# Patient Record
Sex: MALE | Employment: UNEMPLOYED | URBAN - METROPOLITAN AREA
[De-identification: names, ages, dates, MRNs, and addresses within clinical notes are randomized per-mention and may not be internally consistent; named-entity substitution may affect disease eponyms.]

---

## 2018-05-17 ENCOUNTER — HOSPITAL ENCOUNTER (EMERGENCY)
Age: 23
Discharge: HOME | End: 2018-05-17
Attending: EMERGENCY MEDICINE
Payer: OTHER GOVERNMENT

## 2018-05-17 ENCOUNTER — APPOINTMENT (OUTPATIENT)
Dept: CT IMAGING | Age: 23
End: 2018-05-17
Attending: EMERGENCY MEDICINE
Payer: OTHER GOVERNMENT

## 2018-05-17 ENCOUNTER — APPOINTMENT (OUTPATIENT)
Dept: GENERAL RADIOLOGY | Age: 23
End: 2018-05-17
Attending: EMERGENCY MEDICINE
Payer: OTHER GOVERNMENT

## 2018-05-17 VITALS
RESPIRATION RATE: 18 BRPM | WEIGHT: 211 LBS | SYSTOLIC BLOOD PRESSURE: 150 MMHG | TEMPERATURE: 97.5 F | BODY MASS INDEX: 28.58 KG/M2 | HEIGHT: 72 IN | HEART RATE: 81 BPM | DIASTOLIC BLOOD PRESSURE: 95 MMHG | OXYGEN SATURATION: 100 %

## 2018-05-17 DIAGNOSIS — N30.00 ACUTE CYSTITIS WITHOUT HEMATURIA: Primary | ICD-10-CM

## 2018-05-17 LAB
ABO + RH BLD: NORMAL
ALBUMIN SERPL-MCNC: 4.3 G/DL (ref 3.4–5)
ALBUMIN/GLOB SERPL: 1 {RATIO} (ref 0.8–1.7)
ALP SERPL-CCNC: 54 U/L (ref 45–117)
ALT SERPL-CCNC: 51 U/L (ref 16–61)
ANION GAP SERPL CALC-SCNC: 7 MMOL/L (ref 3–18)
APPEARANCE UR: CLEAR
AST SERPL-CCNC: 24 U/L (ref 15–37)
BACTERIA URNS QL MICRO: NEGATIVE /HPF
BASOPHILS # BLD: 0 K/UL (ref 0–0.06)
BASOPHILS NFR BLD: 0 % (ref 0–2)
BILIRUB SERPL-MCNC: 0.6 MG/DL (ref 0.2–1)
BILIRUB UR QL: NEGATIVE
BLOOD GROUP ANTIBODIES SERPL: NORMAL
BUN SERPL-MCNC: 10 MG/DL (ref 7–18)
BUN/CREAT SERPL: 6 (ref 12–20)
CALCIUM SERPL-MCNC: 9.4 MG/DL (ref 8.5–10.1)
CHLORIDE SERPL-SCNC: 101 MMOL/L (ref 100–108)
CO2 SERPL-SCNC: 31 MMOL/L (ref 21–32)
COLOR UR: YELLOW
CREAT SERPL-MCNC: 1.54 MG/DL (ref 0.6–1.3)
DIFFERENTIAL METHOD BLD: NORMAL
EOSINOPHIL # BLD: 0.1 K/UL (ref 0–0.4)
EOSINOPHIL NFR BLD: 1 % (ref 0–5)
EPITH CASTS URNS QL MICRO: NORMAL /LPF (ref 0–5)
ERYTHROCYTE [DISTWIDTH] IN BLOOD BY AUTOMATED COUNT: 12.2 % (ref 11.6–14.5)
GLOBULIN SER CALC-MCNC: 4.3 G/DL (ref 2–4)
GLUCOSE SERPL-MCNC: 99 MG/DL (ref 74–99)
GLUCOSE UR STRIP.AUTO-MCNC: NEGATIVE MG/DL
HCT VFR BLD AUTO: 44.9 % (ref 36–48)
HGB BLD-MCNC: 15.6 G/DL (ref 13–16)
HGB UR QL STRIP: NEGATIVE
KETONES UR QL STRIP.AUTO: NEGATIVE MG/DL
LACTATE BLD-SCNC: 0.7 MMOL/L (ref 0.4–2)
LEUKOCYTE ESTERASE UR QL STRIP.AUTO: ABNORMAL
LYMPHOCYTES # BLD: 2.4 K/UL (ref 0.9–3.6)
LYMPHOCYTES NFR BLD: 30 % (ref 21–52)
MCH RBC QN AUTO: 30.3 PG (ref 24–34)
MCHC RBC AUTO-ENTMCNC: 34.7 G/DL (ref 31–37)
MCV RBC AUTO: 87.2 FL (ref 74–97)
MONOCYTES # BLD: 0.7 K/UL (ref 0.05–1.2)
MONOCYTES NFR BLD: 9 % (ref 3–10)
NEUTS SEG # BLD: 4.8 K/UL (ref 1.8–8)
NEUTS SEG NFR BLD: 60 % (ref 40–73)
NITRITE UR QL STRIP.AUTO: NEGATIVE
PH UR STRIP: 6.5 [PH] (ref 5–8)
PLATELET # BLD AUTO: 228 K/UL (ref 135–420)
PMV BLD AUTO: 10.8 FL (ref 9.2–11.8)
POTASSIUM SERPL-SCNC: 3.8 MMOL/L (ref 3.5–5.5)
PROT SERPL-MCNC: 8.6 G/DL (ref 6.4–8.2)
PROT UR STRIP-MCNC: NEGATIVE MG/DL
RBC # BLD AUTO: 5.15 M/UL (ref 4.7–5.5)
RBC #/AREA URNS HPF: NEGATIVE /HPF (ref 0–5)
SODIUM SERPL-SCNC: 139 MMOL/L (ref 136–145)
SP GR UR REFRACTOMETRY: >1.03 (ref 1–1.03)
SPECIMEN EXP DATE BLD: NORMAL
UROBILINOGEN UR QL STRIP.AUTO: 0.2 EU/DL (ref 0.2–1)
WBC # BLD AUTO: 8 K/UL (ref 4.6–13.2)
WBC URNS QL MICRO: NORMAL /HPF (ref 0–4)

## 2018-05-17 PROCEDURE — 96374 THER/PROPH/DIAG INJ IV PUSH: CPT

## 2018-05-17 PROCEDURE — 71045 X-RAY EXAM CHEST 1 VIEW: CPT

## 2018-05-17 PROCEDURE — 80053 COMPREHEN METABOLIC PANEL: CPT | Performed by: EMERGENCY MEDICINE

## 2018-05-17 PROCEDURE — 87040 BLOOD CULTURE FOR BACTERIA: CPT | Performed by: EMERGENCY MEDICINE

## 2018-05-17 PROCEDURE — 74011000250 HC RX REV CODE- 250: Performed by: EMERGENCY MEDICINE

## 2018-05-17 PROCEDURE — 85025 COMPLETE CBC W/AUTO DIFF WBC: CPT | Performed by: EMERGENCY MEDICINE

## 2018-05-17 PROCEDURE — 86900 BLOOD TYPING SEROLOGIC ABO: CPT | Performed by: EMERGENCY MEDICINE

## 2018-05-17 PROCEDURE — 74177 CT ABD & PELVIS W/CONTRAST: CPT

## 2018-05-17 PROCEDURE — 83605 ASSAY OF LACTIC ACID: CPT

## 2018-05-17 PROCEDURE — 74011636320 HC RX REV CODE- 636/320: Performed by: EMERGENCY MEDICINE

## 2018-05-17 PROCEDURE — 99284 EMERGENCY DEPT VISIT MOD MDM: CPT

## 2018-05-17 PROCEDURE — 96375 TX/PRO/DX INJ NEW DRUG ADDON: CPT

## 2018-05-17 PROCEDURE — 74011250637 HC RX REV CODE- 250/637: Performed by: EMERGENCY MEDICINE

## 2018-05-17 PROCEDURE — 74011250636 HC RX REV CODE- 250/636: Performed by: EMERGENCY MEDICINE

## 2018-05-17 PROCEDURE — 81001 URINALYSIS AUTO W/SCOPE: CPT | Performed by: EMERGENCY MEDICINE

## 2018-05-17 RX ORDER — ONDANSETRON 2 MG/ML
4 INJECTION INTRAMUSCULAR; INTRAVENOUS
Status: COMPLETED | OUTPATIENT
Start: 2018-05-17 | End: 2018-05-17

## 2018-05-17 RX ORDER — METRONIDAZOLE 500 MG/100ML
500 INJECTION, SOLUTION INTRAVENOUS EVERY 12 HOURS
Status: DISCONTINUED | OUTPATIENT
Start: 2018-05-17 | End: 2018-05-18 | Stop reason: HOSPADM

## 2018-05-17 RX ORDER — CEPHALEXIN 500 MG/1
500 CAPSULE ORAL 4 TIMES DAILY
Qty: 20 CAP | Refills: 0 | Status: SHIPPED | OUTPATIENT
Start: 2018-05-17 | End: 2018-05-22

## 2018-05-17 RX ORDER — MORPHINE SULFATE 4 MG/ML
4 INJECTION INTRAVENOUS
Status: DISCONTINUED | OUTPATIENT
Start: 2018-05-17 | End: 2018-05-18 | Stop reason: HOSPADM

## 2018-05-17 RX ORDER — SODIUM CHLORIDE 9 MG/ML
150 INJECTION, SOLUTION INTRAVENOUS ONCE
Status: DISCONTINUED | OUTPATIENT
Start: 2018-05-17 | End: 2018-05-18 | Stop reason: HOSPADM

## 2018-05-17 RX ORDER — SODIUM CHLORIDE 0.9 % (FLUSH) 0.9 %
5-10 SYRINGE (ML) INJECTION AS NEEDED
Status: DISCONTINUED | OUTPATIENT
Start: 2018-05-17 | End: 2018-05-18 | Stop reason: HOSPADM

## 2018-05-17 RX ORDER — ACETAMINOPHEN 500 MG
1000 TABLET ORAL ONCE
Status: COMPLETED | OUTPATIENT
Start: 2018-05-17 | End: 2018-05-17

## 2018-05-17 RX ORDER — ACETAMINOPHEN 325 MG/1
650 TABLET ORAL
Qty: 30 TAB | Refills: 0 | Status: SHIPPED | OUTPATIENT
Start: 2018-05-17 | End: 2019-04-01 | Stop reason: ALTCHOICE

## 2018-05-17 RX ADMIN — CEFEPIME HYDROCHLORIDE 2 G: 2 INJECTION, POWDER, FOR SOLUTION INTRAVENOUS at 17:41

## 2018-05-17 RX ADMIN — ACETAMINOPHEN 1000 MG: 500 TABLET, FILM COATED ORAL at 19:42

## 2018-05-17 RX ADMIN — ONDANSETRON 4 MG: 2 INJECTION INTRAMUSCULAR; INTRAVENOUS at 17:27

## 2018-05-17 RX ADMIN — IOPAMIDOL 80 ML: 612 INJECTION, SOLUTION INTRAVENOUS at 18:19

## 2018-05-17 NOTE — Clinical Note
Your evaluation today showed generalized abdominal pain. Please follow up with a doctor as discussed. The evaluation and treatment done today requires that you follow up with a physician for re-evaluation. Medical problems can change over time and symp toms can get worse or new symptoms can develop over time, therefore, it is important that you follow up as we discussed. Please immediately return to the ER if you have any concerns. Call the ER if you have any questions about what we discussed. At [facility] Emergency Department we are genuinely concerned about your health and comfort. You may be selected to participate in a patient satisfaction survey mailed to your home. We are excited about the opportunity to learn from your expe rience so we may continue to improve. In striving for the very best we believe good is not good enough, but if you rate us as EXCELLENT in all boxes, we have succeeded. We strive to provide EXCELLENT care to you and your family. We appreciate the opportu nity to take care of you, and hope you do well.

## 2018-05-17 NOTE — DISCHARGE INSTRUCTIONS
Male Urinary Tract: Anatomy Sketch    Current as of: March 14, 2017  Content Version: 11.4  © 2575-5393 [employer]. Care instructions adapted under license by [employer] (which disclaims liability or warranty for this information). If you have questions about a medical condition or this instruction, always ask your healthcare professional. [facility] [address] any warranty or liability for your use of this information. Urinary Tract Infection in Male Teens: Care Instructions  Your Care Instructions    A urinary tract infection, or UTI, is a term for an infection anywhere between the kidneys and the urethra. (The urethra is the tube that carries urine from the bladder to outside the body.) Most UTIs are bladder infections. They often cause pain or burning when you urinate. UTIs are caused by bacteria. This means they can be cured with antibiotics. Be sure to complete your treatment so that the infection does not get worse. Your doctor may have you get tests to find out the cause of your UTI. Follow-up care is a key part of your treatment and safety. Be sure to make and go to all appointments, and call your doctor if you are having problems. It's also a good idea to know your test results and keep a list of the medicines you take. How can you care for yourself at home? · Take your antibiotics as directed. Do not stop taking them just because you feel better. You need to take the full course of antibiotics. · Drink extra water and other fluids for the next day or two. This will help make the urine less concentrated and help wash out the bacteria that are causing the infection. (If you have kidney, heart, or liver disease and have to limit fluids, talk with your doctor before you increase the amount of fluids you drink.)  · Avoid carbonated drinks and drinks that have caffeine. They can irritate the bladder. · Urinate often.  Try to empty your bladder each time.  · To relieve pain, take a hot bath. Or you can lay a heating pad set on low over your lower belly or genital area. Never go to sleep with a heating pad in place. To prevent UTIs  · Drink plenty of water each day. This helps you urinate often, which clears bacteria from your system. (If you have kidney, heart, or liver disease and have to limit fluids, talk with your doctor before you increase the amount of fluids you drink.)  · Urinate when you need to. · Keep the tip of your penis clean, especially if you are uncircumcised. The foreskin can trap bacteria, which can then get into the urinary tract and cause an infection. When should you call for help? Call your doctor now or seek immediate medical care if:  ? · Symptoms such as fever, chills, nausea, or vomiting get worse or appear for the first time. ? · You have new pain in your back just below your rib cage. This is called flank pain. ? · There is new blood or pus in your urine. ? · You have any problems with your antibiotic medicine. ? Watch closely for changes in your health, and be sure to contact your doctor if:  ? · You are not getting better after taking an antibiotic for 2 days. ? · Your symptoms go away but then come back. Where can you learn more? Go to http://dorina-lakia.info/. Enter H336 in the search box to learn more about \"Urinary Tract Infection in Male Teens: Care Instructions. \"  Current as of: May 12, 2017  Content Version: 11.4  © 1814-3853 [employer]. Care instructions adapted under license by [employer] (which disclaims liability or warranty for this information). If you have questions about a medical condition or this instruction, always ask your healthcare professional. [address] any warranty or liability for your use of this information.
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
## 2018-05-23 LAB
BACTERIA SPEC CULT: NORMAL
BACTERIA SPEC CULT: NORMAL
SERVICE CMNT-IMP: NORMAL
SERVICE CMNT-IMP: NORMAL

## 2018-07-13 ENCOUNTER — HOSPITAL ENCOUNTER (OUTPATIENT)
Dept: CT IMAGING | Age: 23
Discharge: HOME | End: 2018-07-13
Attending: FAMILY MEDICINE
Payer: OTHER GOVERNMENT

## 2018-07-13 DIAGNOSIS — R51.9 HEAD ACHE: ICD-10-CM

## 2018-07-13 PROCEDURE — 70450 CT HEAD/BRAIN W/O DYE: CPT

## 2018-10-19 ENCOUNTER — HOSPITAL ENCOUNTER (EMERGENCY)
Age: 23
Discharge: HOME | End: 2018-10-19
Attending: EMERGENCY MEDICINE
Payer: OTHER GOVERNMENT

## 2018-10-19 VITALS
WEIGHT: 213 LBS | BODY MASS INDEX: 28.89 KG/M2 | TEMPERATURE: 97.9 F | SYSTOLIC BLOOD PRESSURE: 124 MMHG | RESPIRATION RATE: 16 BRPM | DIASTOLIC BLOOD PRESSURE: 76 MMHG | HEART RATE: 67 BPM | OXYGEN SATURATION: 98 %

## 2018-10-19 DIAGNOSIS — R30.0 DYSURIA: Primary | ICD-10-CM

## 2018-10-19 LAB
APPEARANCE UR: CLEAR
BACTERIA URNS QL MICRO: ABNORMAL /HPF
BILIRUB UR QL: NEGATIVE
COLOR UR: YELLOW
EPITH CASTS URNS QL MICRO: ABNORMAL /LPF (ref 0–5)
GLUCOSE UR STRIP.AUTO-MCNC: NEGATIVE MG/DL
HGB UR QL STRIP: NEGATIVE
KETONES UR QL STRIP.AUTO: NEGATIVE MG/DL
LEUKOCYTE ESTERASE UR QL STRIP.AUTO: ABNORMAL
NITRITE UR QL STRIP.AUTO: NEGATIVE
PH UR STRIP: 7 [PH] (ref 5–8)
PROT UR STRIP-MCNC: NEGATIVE MG/DL
RBC #/AREA URNS HPF: ABNORMAL /HPF (ref 0–5)
SP GR UR REFRACTOMETRY: <1.005 (ref 1–1.03)
UROBILINOGEN UR QL STRIP.AUTO: 0.2 EU/DL (ref 0.2–1)
WBC URNS QL MICRO: ABNORMAL /HPF (ref 0–4)

## 2018-10-19 PROCEDURE — 87491 CHLMYD TRACH DNA AMP PROBE: CPT | Performed by: PHYSICIAN ASSISTANT

## 2018-10-19 PROCEDURE — 87086 URINE CULTURE/COLONY COUNT: CPT | Performed by: PHYSICIAN ASSISTANT

## 2018-10-19 PROCEDURE — 99283 EMERGENCY DEPT VISIT LOW MDM: CPT

## 2018-10-19 PROCEDURE — 81001 URINALYSIS AUTO W/SCOPE: CPT | Performed by: EMERGENCY MEDICINE

## 2018-10-19 NOTE — ED NOTES
Discharge instructions discussed with patient. Patient verbalize understanding. Patient left without discharge paper work.

## 2018-10-19 NOTE — DISCHARGE INSTRUCTIONS
Painful Urination (Dysuria): Care Instructions  Your Care Instructions  Burning pain with urination (dysuria) is a common symptom of a urinary tract infection or other urinary problems. The bladder may become inflamed. This can cause pain when the bladder fills and empties. You may also feel pain if the tube that carries urine from the bladder to the outside of the body (urethra) gets irritated or infected. Sexually transmitted infections (STIs) also may cause pain when you urinate. Sometimes the pain can be caused by things other than an infection. The urethra can be irritated by soaps, perfumes, or foreign objects in the urethra. Kidney stones can cause pain when they pass through the urethra. The cause may be hard to find. You may need tests. Treatment for painful urination depends on the cause. Follow-up care is a key part of your treatment and safety. Be sure to make and go to all appointments, and call your doctor if you are having problems. It's also a good idea to know your test results and keep a list of the medicines you take. How can you care for yourself at home? · Drink extra water for the next day or two. This will help make the urine less concentrated. (If you have kidney, heart, or liver disease and have to limit fluids, talk with your doctor before you increase the amount of fluids you drink.)  · Avoid drinks that are carbonated or have caffeine. They can irritate the bladder. · Urinate often. Try to empty your bladder each time. For women:  · Urinate right after you have sex. · After going to the bathroom, wipe from front to back. · Avoid douches, bubble baths, and feminine hygiene sprays. And avoid other feminine hygiene products that have deodorants. When should you call for help? Call your doctor now or seek immediate medical care if:    · You have new symptoms, such as fever, nausea, or vomiting.     · You have new or worse symptoms of a urinary problem. For example:  ?  You have blood or pus in your urine. ? You have chills or body aches. ? It hurts worse to urinate. ? You have groin or belly pain. ? You have pain in your back just below your rib cage (the flank area).    Watch closely for changes in your health, and be sure to contact your doctor if you have any problems. Where can you learn more? Go to http://dorina-lakia.info/. Enter B093 in the search box to learn more about \"Painful Urination (Dysuria): Care Instructions. \"  Current as of: March 21, 2018  Content Version: 11.8  © 0894-9449 [employer]. Care instructions adapted under license by [employer] (which disclaims liability or warranty for this information). If you have questions about a medical condition or this instruction, always ask your healthcare professional. [address] any warranty or liability for your use of this information.

## 2018-10-19 NOTE — ED PROVIDER NOTES
EMERGENCY DEPARTMENT HISTORY AND PHYSICAL EXAM 
 
Date: 10/19/2018 Patient Name: Lorie Lynne History of Presenting Illness Chief Complaint Patient presents with  Urinary Pain History Provided By: Patient Chief Complaint: dysuria Duration: days Timing:  Acute Location:  Quality: Burning Severity: Moderate Modifying Factors: pain with urination Associated Symptoms: none Additional History (Context): Lorie Lynne is a 20 y.o. male with noted medical hx who presents with dysuria for the past couple days after he started taking pre workout. Treated sx with cranberry azo. Sx resolved on its own and he has not had any dysuria today. No concern for STD/STI. Otherwise fine. Denies fever, chills, abdominal pain, N/V, hematuria, penile d/c, swelling, color change, or any other associated sx. No other complaints or concerns. PCP: Kris Ashton, DO 
 
Current Outpatient Medications Medication Sig Dispense Refill  acetaminophen (TYLENOL) 325 mg tablet Take 2 Tabs by mouth every four (4) hours as needed for Pain. 30 Tab 0 Past History Past Medical History: No past medical history on file. Past Surgical History: No past surgical history on file. Family History: No family history on file. Social History: 
Social History Tobacco Use  Smoking status: Current Every Day Smoker  Smokeless tobacco: Current User Substance Use Topics  Alcohol use: Not on file  Drug use: Yes Types: Marijuana Allergies: 
No Known Allergies Review of Systems Review of Systems Constitutional: Negative for chills and fever. Respiratory: Negative for shortness of breath. Cardiovascular: Negative for chest pain. Gastrointestinal: Negative for abdominal pain, nausea and vomiting. Genitourinary: Positive for dysuria. Negative for discharge, genital sores, hematuria and penile pain. Musculoskeletal: Negative for back pain and neck pain. All other systems reviewed and are negative. All Other Systems Negative Physical Exam  
 
Vitals:  
 10/19/18 0951 10/19/18 1141 BP:  124/76 Pulse: 67 Resp: 16 Temp: 97.9 °F (36.6 °C) SpO2: 98% Weight: 96.6 kg (213 lb) Physical Exam  
Constitutional: He is oriented to person, place, and time. He appears well-developed and well-nourished. No distress. HENT:  
Head: Normocephalic and atraumatic. Eyes: Conjunctivae are normal.  
Neck: Normal range of motion. Neck supple. Cardiovascular: Normal rate, regular rhythm and normal heart sounds. Pulmonary/Chest: Effort normal and breath sounds normal. No respiratory distress. He exhibits no tenderness. Abdominal: Soft. Bowel sounds are normal. He exhibits no distension. There is no tenderness. There is no rebound and no guarding. Musculoskeletal: Normal range of motion. He exhibits no edema or deformity. Neurological: He is alert and oriented to person, place, and time. Skin: Skin is warm and dry. He is not diaphoretic. Psychiatric: He has a normal mood and affect. Nursing note and vitals reviewed. Diagnostic Study Results Labs - Recent Results (from the past 12 hour(s)) URINALYSIS W/ RFLX MICROSCOPIC Collection Time: 10/19/18  9:34 AM  
Result Value Ref Range Color YELLOW Appearance CLEAR Specific gravity <1.005 (L) 1.005 - 1.030  
 pH (UA) 7.0 5.0 - 8.0 Protein NEGATIVE  NEG mg/dL Glucose NEGATIVE  NEG mg/dL Ketone NEGATIVE  NEG mg/dL Bilirubin NEGATIVE  NEG Blood NEGATIVE  NEG Urobilinogen 0.2 0.2 - 1.0 EU/dL Nitrites NEGATIVE  NEG Leukocyte Esterase TRACE (A) NEG URINE MICROSCOPIC ONLY Collection Time: 10/19/18  9:34 AM  
Result Value Ref Range WBC 2 to 6 0 - 4 /hpf  
 RBC NONE 0 - 5 /hpf Epithelial cells FEW 0 - 5 /lpf Bacteria FEW (A) NEG /hpf Radiologic Studies -  
 No orders to display CT Results  (Last 48 hours) None CXR Results  (Last 48 hours) None Medical Decision Making I am the first provider for this patient. I reviewed the vital signs, available nursing notes, past medical history, past surgical history, family history and social history. Vital Signs-Reviewed the patient's vital signs. Pulse Oximetry Analysis -  100 % RA Records Reviewed: Nursing Notes and Old Medical Records Procedures: None Procedures Provider Notes (Medical Decision Making):  
 
 
Differential: kidney stone, UTI, GC, HSV Plan: Will order ua 11:45 AM 
Have shared reassuring UA results with patient, advised to discontinue prework out and to focus on hydration. Advised follow up with PCP if symptoms return. Patient agrees with the plan and management and states all questions have been thoroughly answered and there are no more remaining questions. MED RECONCILIATION: 
No current facility-administered medications for this encounter. Current Outpatient Medications Medication Sig  
 acetaminophen (TYLENOL) 325 mg tablet Take 2 Tabs by mouth every four (4) hours as needed for Pain. Disposition: 
Home DISCHARGE NOTE:  
Pt has been reexamined. Patient has no new complaints, changes, or physical findings. Care plan outlined and precautions discussed. Results of workup were reviewed with the patient. All medications were reviewed with the patient. All of pt's questions and concerns were addressed. Patient was instructed and agrees to follow up with PCP, as well as to return to the ED upon further deterioration. Patient is ready to go home. Follow-up Information Follow up With Specialties Details Why Contact Info [facility] EMERGENCY DEPT Emergency Medicine  As needed [address] [id number] 
[phone] Alejandra Roque, DO Family Practice In 2 days As needed [address] [place] [address] [place] [address] 
[phone] Current Discharge Medication List  
  
 
 
 
 
Diagnosis Clinical Impression: 1. Dysuria Scribe Attestation Kim Solis acting as a scribe for and in the presence of [place], [place] October 19, 2018 at 11:34 AM 
    
Provider Attestation:     
I personally performed the services described in the documentation, reviewed the documentation, as recorded by the scribe in my presence, and it accurately and completely records my words and actions.  October 19, 2018 at 11:34 AM - [place], [place]

## 2018-10-20 LAB
BACTERIA SPEC CULT: NORMAL
SERVICE CMNT-IMP: NORMAL

## 2018-10-22 ENCOUNTER — HOSPITAL ENCOUNTER (EMERGENCY)
Age: 23
Discharge: HOME | End: 2018-10-22
Attending: EMERGENCY MEDICINE
Payer: OTHER GOVERNMENT

## 2018-10-22 VITALS
RESPIRATION RATE: 18 BRPM | SYSTOLIC BLOOD PRESSURE: 139 MMHG | DIASTOLIC BLOOD PRESSURE: 85 MMHG | HEART RATE: 80 BPM | OXYGEN SATURATION: 100 % | TEMPERATURE: 97.6 F

## 2018-10-22 DIAGNOSIS — R30.0 DYSURIA: Primary | ICD-10-CM

## 2018-10-22 LAB
APPEARANCE UR: CLEAR
BACTERIA URNS QL MICRO: ABNORMAL /HPF
BILIRUB UR QL: NEGATIVE
C TRACH RRNA SPEC QL NAA+PROBE: POSITIVE
C TRACH RRNA SPEC QL NAA+PROBE: POSITIVE
COLOR UR: YELLOW
EPITH CASTS URNS QL MICRO: ABNORMAL /LPF (ref 0–5)
GLUCOSE UR STRIP.AUTO-MCNC: NEGATIVE MG/DL
HGB UR QL STRIP: NEGATIVE
KETONES UR QL STRIP.AUTO: ABNORMAL MG/DL
LEUKOCYTE ESTERASE UR QL STRIP.AUTO: ABNORMAL
N GONORRHOEA RRNA SPEC QL NAA+PROBE: NEGATIVE
N GONORRHOEA RRNA SPEC QL NAA+PROBE: NEGATIVE
NITRITE UR QL STRIP.AUTO: NEGATIVE
PH UR STRIP: 6 [PH] (ref 5–8)
PROT UR STRIP-MCNC: NEGATIVE MG/DL
RBC #/AREA URNS HPF: ABNORMAL /HPF (ref 0–5)
SP GR UR REFRACTOMETRY: 1.03 (ref 1–1.03)
SPECIMEN SOURCE: ABNORMAL
SPECIMEN SOURCE: ABNORMAL
UROBILINOGEN UR QL STRIP.AUTO: 0.2 EU/DL (ref 0.2–1)
WBC URNS QL MICRO: ABNORMAL /HPF (ref 0–4)

## 2018-10-22 PROCEDURE — 96372 THER/PROPH/DIAG INJ SC/IM: CPT

## 2018-10-22 PROCEDURE — 81001 URINALYSIS AUTO W/SCOPE: CPT | Performed by: EMERGENCY MEDICINE

## 2018-10-22 PROCEDURE — 74011250636 HC RX REV CODE- 250/636: Performed by: PHYSICIAN ASSISTANT

## 2018-10-22 PROCEDURE — 87491 CHLMYD TRACH DNA AMP PROBE: CPT | Performed by: EMERGENCY MEDICINE

## 2018-10-22 PROCEDURE — 99283 EMERGENCY DEPT VISIT LOW MDM: CPT

## 2018-10-22 PROCEDURE — 74011250637 HC RX REV CODE- 250/637: Performed by: PHYSICIAN ASSISTANT

## 2018-10-22 RX ORDER — AZITHROMYCIN 250 MG/1
1000 TABLET, FILM COATED ORAL
Status: COMPLETED | OUTPATIENT
Start: 2018-10-22 | End: 2018-10-22

## 2018-10-22 RX ADMIN — AZITHROMYCIN 1000 MG: 250 TABLET, FILM COATED ORAL at 06:23

## 2018-10-22 RX ADMIN — LIDOCAINE HYDROCHLORIDE 250 MG: 10 INJECTION, SOLUTION EPIDURAL; INFILTRATION; INTRACAUDAL; PERINEURAL at 06:24

## 2018-10-22 NOTE — ED TRIAGE NOTES
Patient arrived complaining of burning when he urinates. Patient states that he had the same problem last week and they were unable to find anything wrong.

## 2018-10-22 NOTE — LETTER
11/5/2018 7:53 PM 
 
Mr. [address] [facility] [address] Dear Mr. Escalera: 
 
The results of your lab work performed in our office were abnormal and we have had difficulty reaching you by telephone. Please contact our office as soon as possible to discuss these results. Sincerely, 
 
 
Yas Blunt

## 2018-10-22 NOTE — DISCHARGE INSTRUCTIONS
Painful Urination (Dysuria): Care Instructions  Your Care Instructions  Burning pain with urination (dysuria) is a common symptom of a urinary tract infection or other urinary problems. The bladder may become inflamed. This can cause pain when the bladder fills and empties. You may also feel pain if the tube that carries urine from the bladder to the outside of the body (urethra) gets irritated or infected. Sexually transmitted infections (STIs) also may cause pain when you urinate. Sometimes the pain can be caused by things other than an infection. The urethra can be irritated by soaps, perfumes, or foreign objects in the urethra. Kidney stones can cause pain when they pass through the urethra. The cause may be hard to find. You may need tests. Treatment for painful urination depends on the cause. Follow-up care is a key part of your treatment and safety. Be sure to make and go to all appointments, and call your doctor if you are having problems. It's also a good idea to know your test results and keep a list of the medicines you take. How can you care for yourself at home? · Drink extra water for the next day or two. This will help make the urine less concentrated. (If you have kidney, heart, or liver disease and have to limit fluids, talk with your doctor before you increase the amount of fluids you drink.)  · Avoid drinks that are carbonated or have caffeine. They can irritate the bladder. · Urinate often. Try to empty your bladder each time. For women:  · Urinate right after you have sex. · After going to the bathroom, wipe from front to back. · Avoid douches, bubble baths, and feminine hygiene sprays. And avoid other feminine hygiene products that have deodorants. When should you call for help? Call your doctor now or seek immediate medical care if:    · You have new symptoms, such as fever, nausea, or vomiting.     · You have new or worse symptoms of a urinary problem. For example:  ?  You have blood or pus in your urine. ? You have chills or body aches. ? It hurts worse to urinate. ? You have groin or belly pain. ? You have pain in your back just below your rib cage (the flank area).    Watch closely for changes in your health, and be sure to contact your doctor if you have any problems. Where can you learn more? Go to http://dorina-lakia.info/. Enter K233 in the search box to learn more about \"Painful Urination (Dysuria): Care Instructions. \"  Current as of: March 21, 2018  Content Version: 11.8  © 4960-8811 [employer], [employer]. Care instructions adapted under license by [employer] (which disclaims liability or warranty for this information). If you have questions about a medical condition or this instruction, always ask your healthcare professional. [address] any warranty or liability for your use of this information.

## 2018-10-22 NOTE — ED PROVIDER NOTES
The history is provided by the patient. Urinary Pain This is a new problem. Episode onset: 1 week ago. The problem occurs every urination. The problem has been gradually worsening. The quality of the pain is described as burning. The pain is mild. There has been no fever. He is sexually active. There is no history of pyelonephritis. Pertinent negatives include no chills, no sweats, no nausea, no vomiting, no discharge, no frequency, no hematuria, no hesitancy, no urgency, no flank pain, no penile discharge, no abdominal pain and no back pain. He has tried nothing for the symptoms. His past medical history does not include kidney stones or recurrent UTIs. Does not feel he was exposed to STD, but is unsure and would like tx. Was seen earlier this week for the same, but was not tx'd at that time and has not been called about his results. Denies any modifying factors or other complaints. No past medical history on file. No past surgical history on file. No family history on file. Social History Socioeconomic History  Marital status:  Spouse name: Not on file  Number of children: Not on file  Years of education: Not on file  Highest education level: Not on file Social Needs  Financial resource strain: Not on file  Food insecurity - worry: Not on file  Food insecurity - inability: Not on file  Transportation needs - medical: Not on file  Transportation needs - non-medical: Not on file Occupational History  Not on file Tobacco Use  Smoking status: Current Every Day Smoker  Smokeless tobacco: Current User Substance and Sexual Activity  Alcohol use: Not on file  Drug use: Yes Types: Marijuana  Sexual activity: Not on file Other Topics Concern  Not on file Social History Narrative  Not on file ALLERGIES: Patient has no known allergies. Review of Systems Constitutional: Negative for chills and fever. HENT: Negative for ear pain, rhinorrhea and sore throat. Eyes: Negative for pain and redness. Respiratory: Negative for cough and shortness of breath. Cardiovascular: Negative for chest pain. Gastrointestinal: Negative for abdominal pain, constipation, diarrhea, nausea and vomiting. Genitourinary: Positive for dysuria. Negative for discharge, flank pain, frequency, genital sores, hematuria, hesitancy, penile discharge, penile pain, penile swelling, scrotal swelling, testicular pain and urgency. Musculoskeletal: Negative for back pain. Skin: Negative. Neurological: Negative for dizziness, light-headedness and headaches. Psychiatric/Behavioral: Negative. All other systems reviewed and are negative. Vitals:  
 10/22/18 8418 BP: 139/85 Pulse: 80 Resp: 18 Temp: 97.6 °F (36.4 °C) SpO2: 100% Physical Exam  
Constitutional: He is oriented to person, place, and time. He appears well-developed and well-nourished. HENT:  
Head: Normocephalic and atraumatic. Right Ear: Tympanic membrane, external ear and ear canal normal.  
Left Ear: Tympanic membrane, external ear and ear canal normal.  
Nose: Nose normal.  
Mouth/Throat: Oropharynx is clear and moist and mucous membranes are normal.  
Eyes: Conjunctivae and EOM are normal. Pupils are equal, round, and reactive to light. Neck: Normal range of motion. Cardiovascular: Normal rate, regular rhythm and normal heart sounds. Pulmonary/Chest: Effort normal and breath sounds normal.  
Abdominal: Soft. Bowel sounds are normal. There is no tenderness. There is no rebound and no guarding. Musculoskeletal: Normal range of motion. Neurological: He is alert and oriented to person, place, and time. Skin: Skin is warm and dry. No rash noted. Psychiatric: He has a normal mood and affect. His behavior is normal. Judgment and thought content normal.  
Nursing note and vitals reviewed.  
  
 
MDM 
 Number of Diagnoses or Management Options Dysuria:  
Diagnosis management comments: DDx: STD, UTI, epididymitis, prostatitis, orchitis ED COURSE:  Urine or urethral specimen(s) may have been collected to check for gonorrhea and chlamydia. IMPRESSION AND MEDICAL DECISION MAKING: 
Based upon the patient's presentation with noted HPI and PE, along with the work up done in the emergency department, I believe that the patient is had an STD exposure and will be treated with rocephin and azithromycin. Follow-up Activity limitations:  None Condition on Discharge:  Stable Discharge instructions/plan: You were treated in for the exposure to a possible STD. No sexual activity for the next 2 weeks. Encourage your partner(s) to get evaluated and treated. DO NOT resume sexual activity until you and your partner(s) are treated and are symptoms free. FOLLOW UP APPOINTMENT:  Your primary doctor in the next week. Return if any concerns or worsening of condition(s) Note that it takes 48-72 hours to process some of the tests done today. You can obtain results by calling emergency room at [phone]. You also can obtain your results and treatment through the [address] [place] [address], [place] [address] and STD Clinic at [address], [address] [place] [address], [phone]. Pt results have been reviewed with the patient and any family present. They have been counseled regarding diagnosis, treatment, and plan. They verbally convey understanding and agreement of the signs, symptoms, diagnosis, treatment and prognosis and additionally agrees to follow up as discussed. They also agree with the care-plan and convey that all of their questions have been answered.  I have also provided discharge instructions for them that include: educational information regarding their diagnosis and treatment, and list of reasons why they would want to return to the ED prior to their follow-up appointment, should their condition change. Ko Bledsoe PA-C 
6:21 AM  
 
  
Amount and/or Complexity of Data Reviewed Clinical lab tests: reviewed and ordered Review and summarize past medical records: yes Discuss the patient with other providers: yes Risk of Complications, Morbidity, and/or Mortality Presenting problems: low Diagnostic procedures: low Management options: low Patient Progress Patient progress: stable Procedures Labs Reviewed URINALYSIS W/ RFLX MICROSCOPIC - Abnormal; Notable for the following components:  
    Result Value Ketone TRACE (*) Leukocyte Esterase SMALL (*) All other components within normal limits CHLAMYDIA/NEISSERIA AMPLIFICATION  
URINE MICROSCOPIC ONLY Diagnosis: 1. Dysuria Disposition: Discharge to home. Follow-up Information Follow up With Specialties Details Why Contact Info [facility] Emergency Medicine  As needed, If symptoms worsen [address] [place] [id number] 
[phone] Arielle Madrigal, DO Family Practice Go in 2 days  [address] [facility] [address] 
[phone] Medication List  
  
CONTINUE taking these medications   
acetaminophen 325 mg tablet Commonly known as:  TYLENOL Take 2 Tabs by mouth every four (4) hours as needed for Pain.

## 2018-11-02 ENCOUNTER — HOSPITAL ENCOUNTER (OUTPATIENT)
Dept: GENERAL RADIOLOGY | Age: 23
Discharge: HOME | End: 2018-11-02
Payer: OTHER GOVERNMENT

## 2018-11-02 DIAGNOSIS — M54.50 LOW BACK PAIN: ICD-10-CM

## 2018-11-02 DIAGNOSIS — M54.9 MID BACK PAIN: ICD-10-CM

## 2018-11-02 DIAGNOSIS — M54.2 CERVICALGIA: ICD-10-CM

## 2018-11-02 PROCEDURE — 72040 X-RAY EXAM NECK SPINE 2-3 VW: CPT

## 2018-11-02 PROCEDURE — 72072 X-RAY EXAM THORAC SPINE 3VWS: CPT

## 2018-11-02 PROCEDURE — 72100 X-RAY EXAM L-S SPINE 2/3 VWS: CPT

## 2018-11-06 NOTE — PROGRESS NOTES
Pt treated in ED, attempted to call both numbers, no answer and unable to leave message, will send letter

## 2018-11-13 ENCOUNTER — OFFICE VISIT (OUTPATIENT)
Dept: ORTHOPEDIC SURGERY | Age: 23
End: 2018-11-13

## 2018-11-13 VITALS
WEIGHT: 215 LBS | DIASTOLIC BLOOD PRESSURE: 81 MMHG | HEIGHT: 70 IN | SYSTOLIC BLOOD PRESSURE: 136 MMHG | BODY MASS INDEX: 30.78 KG/M2 | HEART RATE: 89 BPM

## 2018-11-13 DIAGNOSIS — M79.2 NEURITIS: ICD-10-CM

## 2018-11-13 DIAGNOSIS — M51.34 DDD (DEGENERATIVE DISC DISEASE), THORACIC: ICD-10-CM

## 2018-11-13 DIAGNOSIS — M62.838 MUSCLE SPASM: ICD-10-CM

## 2018-11-13 DIAGNOSIS — S16.1XXA STRAIN OF NECK MUSCLE, INITIAL ENCOUNTER: ICD-10-CM
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
## 2018-11-13 DIAGNOSIS — S39.012A STRAIN OF LUMBAR REGION, INITIAL ENCOUNTER: ICD-10-CM

## 2018-11-13 DIAGNOSIS — S29.019A THORACIC MYOFASCIAL STRAIN, INITIAL ENCOUNTER: ICD-10-CM

## 2018-11-13 RX ORDER — METAXALONE 800 MG/1
TABLET ORAL
Qty: 60 TAB | Refills: 1 | Status: SHIPPED | OUTPATIENT
Start: 2018-11-13 | End: 2019-01-23 | Stop reason: SDUPTHER

## 2018-11-13 RX ORDER — DICLOFENAC SODIUM 75 MG/1
75 TABLET, DELAYED RELEASE ORAL 2 TIMES DAILY WITH MEALS
Qty: 60 TAB | Refills: 1 | Status: SHIPPED | OUTPATIENT
Start: 2018-11-13 | End: 2019-01-23 | Stop reason: SDUPTHER

## 2018-11-13 RX ORDER — GABAPENTIN 300 MG/1
CAPSULE ORAL
Qty: 60 CAP | Refills: 1 | Status: SHIPPED | OUTPATIENT
Start: 2018-11-13 | End: 2019-01-23 | Stop reason: SDUPTHER
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
## 2018-11-13 NOTE — PATIENT INSTRUCTIONS
Low Back Arthritis: Exercises  Your Care Instructions  Here are some examples of typical rehabilitation exercises for your condition. Start each exercise slowly. Ease off the exercise if you start to have pain. Your doctor or physical therapist will tell you when you can start these exercises and which ones will work best for you. When you are not being active, find a comfortable position for rest. Some people are comfortable on the floor or a medium-firm bed with a small pillow under their head and another under their knees. Some people prefer to lie on their side with a pillow between their knees. Don't stay in one position for too long. Take short walks (10 to 20 minutes) every 2 to 3 hours. Avoid slopes, hills, and stairs until you feel better. Walk only distances you can manage without pain, especially leg pain. How to do the exercises  Pelvic tilt    1. Lie on your back with your knees bent. 2. \"Brace\" your stomach--tighten your muscles by pulling in and imagining your belly button moving toward your spine. 3. Press your lower back into the floor. You should feel your hips and pelvis rock back. 4. Hold for 6 seconds while breathing smoothly. 5. Relax and allow your pelvis and hips to rock forward. 6. Repeat 8 to 12 times. Back stretches    1. Get down on your hands and knees on the floor. 2. Relax your head and allow it to droop. Round your back up toward the ceiling until you feel a nice stretch in your upper, middle, and lower back. Hold this stretch for as long as it feels comfortable, or about 15 to 30 seconds. 3. Return to the starting position with a flat back while you are on your hands and knees. 4. Let your back sway by pressing your stomach toward the floor. Lift your buttocks toward the ceiling. 5. Hold this position for 15 to 30 seconds. 6. Repeat 2 to 4 times. Follow-up care is a key part of your treatment and safety.  Be sure to make and go to all appointments, and call your doctor if you are having problems. It's also a good idea to know your test results and keep a list of the medicines you take. Where can you learn more? Go to http://dorina-lakia.info/. Enter I475 in the search box to learn more about \"Low Back Arthritis: Exercises. \"  Current as of: November 29, 2017  Content Version: 11.8  © 6093-8566 [employer]. Care instructions adapted under license by [employer] (which disclaims liability or warranty for this information). If you have questions about a medical condition or this instruction, always ask your healthcare professional. [place] [address] any warranty or liability for your use of this information. Healthy Upper Back: Exercises  Your Care Instructions  Here are some examples of exercises for your upper back. Start each exercise slowly. Ease off the exercise if you start to have pain. Your doctor or physical therapist will tell you when you can start these exercises and which ones will work best for you. How to do the exercises  Lower neck and upper back stretch    7. Stretch your arms out in front of your body. Clasp one hand on top of your other hand. 8. Gently reach out so that you feel your shoulder blades stretching away from each other. 9. Gently bend your head forward. 10. Hold for 15 to 30 seconds. 11. Repeat 2 to 4 times. Midback stretch    7. Kneel on the floor, and sit back on your ankles. 8. Lean forward, place your hands on the floor, and stretch your arms out in front of you. Rest your head between your arms. 9. Gently push your chest toward the floor, reaching as far in front of you as possible. 10. Hold for 15 to 30 seconds. 11. Repeat 2 to 4 times. Shoulder rolls    1. Sit comfortably with your feet shoulder-width apart. You can also do this exercise while standing. 2. Roll your shoulders up, then back, and then down in a smooth, circular motion.   3. Repeat 2 to 4 times. Wall push-up    1. Stand against a wall with your feet about 12 to 24 inches back from the wall. If you feel any pain when you do this exercise, stand closer to the wall. 2. Place your hands on the wall slightly wider apart than your shoulders, and lean forward. 3. Gently lean your body toward the wall. Then push back to your starting position. Keep the motion smooth and controlled. 4. Repeat 8 to 12 times. Resisted shoulder blade squeeze    1. Sit or stand, holding the band in both hands in front of you. Keep your elbows close to your sides, bent at a 90-degree angle. Your palms should face up. 2. Squeeze your shoulder blades together, and move your arms to the outside, stretching the band. Be sure to keep your elbows at your sides while you do this. 3. Relax. 4. Repeat 8 to 12 times. Resisted rows    1. Put the band around a solid object, such as a bedpost, at about waist level. Hold one end of the band in each hand. 2. With your elbows at your sides and bent to 90 degrees, pull the band back to move your shoulder blades toward each other. Return to the starting position. 3. Repeat 8 to 12 times. Follow-up care is a key part of your treatment and safety. Be sure to make and go to all appointments, and call your doctor if you are having problems. It's also a good idea to know your test results and keep a list of the medicines you take. Where can you learn more? Go to http://dorina-lakia.info/. Enter O289 in the search box to learn more about \"Healthy Upper Back: Exercises. \"  Current as of: November 29, 2017  Content Version: 11.8  © 3856-5495 [employer], [employer]. Care instructions adapted under license by [employer] (which disclaims liability or warranty for this information).  If you have questions about a medical condition or this instruction, always ask your healthcare professional. Saskia Garcia disclaims any warranty or liability for your use of this information.

## 2018-11-19 ENCOUNTER — HOSPITAL ENCOUNTER (OUTPATIENT)
Dept: PHYSICAL THERAPY | Age: 23
Discharge: HOME | End: 2018-11-19
Payer: OTHER GOVERNMENT

## 2018-11-19 PROCEDURE — 97110 THERAPEUTIC EXERCISES: CPT

## 2018-11-19 PROCEDURE — 97112 NEUROMUSCULAR REEDUCATION: CPT

## 2018-11-19 PROCEDURE — 97161 PT EVAL LOW COMPLEX 20 MIN: CPT
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
## 2018-11-26 ENCOUNTER — HOSPITAL ENCOUNTER (OUTPATIENT)
Dept: PHYSICAL THERAPY | Age: 23
Discharge: HOME | End: 2018-11-26
Payer: OTHER GOVERNMENT

## 2018-11-26 PROCEDURE — 97112 NEUROMUSCULAR REEDUCATION: CPT

## 2018-11-26 PROCEDURE — 97014 ELECTRIC STIMULATION THERAPY: CPT
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
## 2018-11-28 ENCOUNTER — TELEPHONE (OUTPATIENT)
Dept: ORTHOPEDIC SURGERY | Age: 23
End: 2018-11-28

## 2018-11-28 NOTE — TELEPHONE ENCOUNTER
Patient is requesting a letter from Dr. Aura Arias for his employer advising that he is ok to lift 50 pounds or more on a regular basis. Patient can be reached at [phone] if any questions or please advise when ready at same number.

## 2018-11-28 NOTE — TELEPHONE ENCOUNTER
Returned call to patient, verified , informed patient Dr. Abhinav Mejia is currently out of the office at this time. However she will return in the morning, and will be able to discuss with her at that time. Patient verbalized agreement/understanding.

## 2018-11-29 NOTE — TELEPHONE ENCOUNTER
Patient called to check status of letter request - he states he has a second interview pending until letter is prepared.   Please advise patient as soon as possible at [phone]

## 2018-11-29 NOTE — TELEPHONE ENCOUNTER
Per Dr. Alberto Setting, patient must be seen and evaluated in office for letter. Returned call to patient, verified , informed patient of above. Patient verbalized agreement/understanding. Patient will return call to office at a later time to set up appt. No further action required at this time.

## 2018-11-30 ENCOUNTER — HOSPITAL ENCOUNTER (OUTPATIENT)
Dept: PHYSICAL THERAPY | Age: 23
Discharge: HOME | End: 2018-11-30
Payer: OTHER GOVERNMENT

## 2018-11-30 PROCEDURE — 97110 THERAPEUTIC EXERCISES: CPT

## 2018-11-30 PROCEDURE — 97014 ELECTRIC STIMULATION THERAPY: CPT

## 2018-11-30 PROCEDURE — 97112 NEUROMUSCULAR REEDUCATION: CPT
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
## 2018-12-03 ENCOUNTER — HOSPITAL ENCOUNTER (OUTPATIENT)
Dept: PHYSICAL THERAPY | Age: 23
Discharge: HOME | End: 2018-12-03
Payer: OTHER GOVERNMENT

## 2018-12-03 PROCEDURE — 97110 THERAPEUTIC EXERCISES: CPT | Performed by: PHYSICAL THERAPIST

## 2018-12-03 PROCEDURE — 97112 NEUROMUSCULAR REEDUCATION: CPT | Performed by: PHYSICAL THERAPIST

## 2018-12-03 PROCEDURE — 97530 THERAPEUTIC ACTIVITIES: CPT | Performed by: PHYSICAL THERAPIST
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
## 2018-12-07 ENCOUNTER — APPOINTMENT (OUTPATIENT)
Dept: PHYSICAL THERAPY | Age: 23
End: 2018-12-07
Payer: OTHER GOVERNMENT

## 2018-12-10 ENCOUNTER — HOSPITAL ENCOUNTER (OUTPATIENT)
Dept: PHYSICAL THERAPY | Age: 23
Discharge: HOME | End: 2018-12-10
Payer: OTHER GOVERNMENT

## 2018-12-10 PROCEDURE — 97110 THERAPEUTIC EXERCISES: CPT

## 2018-12-10 PROCEDURE — 97112 NEUROMUSCULAR REEDUCATION: CPT
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
## 2018-12-14 ENCOUNTER — HOSPITAL ENCOUNTER (OUTPATIENT)
Dept: PHYSICAL THERAPY | Age: 23
Discharge: HOME | End: 2018-12-14
Payer: OTHER GOVERNMENT

## 2018-12-14 PROCEDURE — 97110 THERAPEUTIC EXERCISES: CPT

## 2018-12-14 PROCEDURE — 97112 NEUROMUSCULAR REEDUCATION: CPT
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
## 2018-12-17 ENCOUNTER — HOSPITAL ENCOUNTER (OUTPATIENT)
Dept: PHYSICAL THERAPY | Age: 23
Discharge: HOME | End: 2018-12-17
Payer: OTHER GOVERNMENT

## 2018-12-17 PROCEDURE — 97530 THERAPEUTIC ACTIVITIES: CPT | Performed by: PHYSICAL THERAPIST

## 2018-12-17 PROCEDURE — 97012 MECHANICAL TRACTION THERAPY: CPT | Performed by: PHYSICAL THERAPIST
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
## 2018-12-28 ENCOUNTER — HOSPITAL ENCOUNTER (OUTPATIENT)
Dept: PHYSICAL THERAPY | Age: 23
Discharge: HOME | End: 2018-12-28
Payer: OTHER GOVERNMENT

## 2018-12-28 PROCEDURE — 97012 MECHANICAL TRACTION THERAPY: CPT | Performed by: PHYSICAL THERAPIST

## 2018-12-28 PROCEDURE — 97112 NEUROMUSCULAR REEDUCATION: CPT | Performed by: PHYSICAL THERAPIST

## 2018-12-28 PROCEDURE — 97110 THERAPEUTIC EXERCISES: CPT | Performed by: PHYSICAL THERAPIST
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
## 2019-01-19 ENCOUNTER — HOSPITAL ENCOUNTER (EMERGENCY)
Age: 24
Discharge: HOME | End: 2019-01-19
Attending: EMERGENCY MEDICINE | Admitting: EMERGENCY MEDICINE
Payer: OTHER GOVERNMENT

## 2019-01-19 VITALS
SYSTOLIC BLOOD PRESSURE: 122 MMHG | OXYGEN SATURATION: 96 % | DIASTOLIC BLOOD PRESSURE: 72 MMHG | TEMPERATURE: 101.2 F | RESPIRATION RATE: 17 BRPM | HEART RATE: 96 BPM

## 2019-01-19 DIAGNOSIS — J06.9 VIRAL UPPER RESPIRATORY TRACT INFECTION: Primary | ICD-10-CM

## 2019-01-19 PROCEDURE — 74011250637 HC RX REV CODE- 250/637: Performed by: STUDENT IN AN ORGANIZED HEALTH CARE EDUCATION/TRAINING PROGRAM

## 2019-01-19 PROCEDURE — 99283 EMERGENCY DEPT VISIT LOW MDM: CPT

## 2019-01-19 RX ORDER — IBUPROFEN 600 MG/1
600 TABLET ORAL
Qty: 20 TAB | Refills: 0 | Status: SHIPPED | OUTPATIENT
Start: 2019-01-19 | End: 2019-04-01

## 2019-01-19 RX ORDER — IBUPROFEN 600 MG/1
600 TABLET ORAL
Status: COMPLETED | OUTPATIENT
Start: 2019-01-19 | End: 2019-01-19

## 2019-01-19 RX ORDER — ACETAMINOPHEN 325 MG/1
1000 TABLET ORAL
Qty: 20 TAB | Refills: 0 | Status: SHIPPED | OUTPATIENT
Start: 2019-01-19 | End: 2019-01-23 | Stop reason: SDUPTHER

## 2019-01-19 RX ADMIN — IBUPROFEN 600 MG: 600 TABLET ORAL at 04:54

## 2019-01-19 NOTE — ED PROVIDER NOTES
EMERGENCY DEPARTMENT HISTORY AND PHYSICAL EXAM 
 
4:00 AM 
 
 
Date: 1/19/2019 Patient Name: Pattie Coon History of Presenting Illness Chief Complaint Patient presents with  Fever History Provided By: Patient Chief Complaint: Cough, Congestion, Runny nose, HA, Fever Duration:  Days Timing:  Gradual 
Location: As above Quality: Pressure Severity: Mild Modifying Factors: fever started yesterday, symptoms resolved following medication. Associated Symptoms: denies any other associated signs or symptoms Additional History (Context): Pattie Coon is a 20 y.o. male with no pmh that presents to the emergency room with several days of URI like symptoms as well as one day of fever. Reports several people have similar symptoms around him. No other symptoms at this time. Denies any neck pain PCP: Luis Miguel Esteban, DO 
 
Current Outpatient Medications Medication Sig Dispense Refill  ibuprofen (MOTRIN) 600 mg tablet Take 1 Tab by mouth every six (6) hours as needed for Pain. 20 Tab 0  
 acetaminophen (TYLENOL) 325 mg tablet Take 3 Tabs by mouth every six (6) hours as needed for Pain. 20 Tab 0  
 diclofenac EC (VOLTAREN) 75 mg EC tablet Take 1 Tab by mouth two (2) times daily (with meals). 60 Tab 1  
 metaxalone (SKELAXIN) 800 mg tablet 1 po bid -tid as needed for spasm 60 Tab 1  
 gabapentin (NEURONTIN) 300 mg capsule Take 1 in the evening after dinner for 1 week then increase to 2 60 Cap 1  
 acetaminophen (TYLENOL) 325 mg tablet Take 2 Tabs by mouth every four (4) hours as needed for Pain. 30 Tab 0 Past History Past Medical History: No past medical history on file. Past Surgical History: 
Past Surgical History:  
Procedure Laterality Date  HX KNEE ARTHROSCOPY Left Family History: No family history on file. Social History: 
Social History Tobacco Use  Smoking status: Current Some Day Smoker  Smokeless tobacco: Never Used Substance Use Topics  Alcohol use: Yes  Drug use: Yes Types: Marijuana Allergies: 
No Known Allergies Review of Systems Review of Systems Constitutional: Positive for chills, fatigue and fever. Negative for activity change, appetite change, diaphoresis and unexpected weight change. HENT: Positive for congestion, rhinorrhea and sinus pressure. Negative for dental problem, drooling, ear pain, nosebleeds, sneezing, sore throat, tinnitus, trouble swallowing and voice change. Eyes: Negative for pain, discharge and itching. Respiratory: Negative for apnea, cough, choking, chest tightness and shortness of breath. Cardiovascular: Negative for chest pain and leg swelling. Gastrointestinal: Negative for abdominal distention, abdominal pain, blood in stool, diarrhea, nausea and vomiting. Endocrine: Negative for polydipsia, polyphagia and polyuria. Genitourinary: Negative for difficulty urinating and discharge. Musculoskeletal: Negative for arthralgias, back pain, gait problem, myalgias and neck pain. Skin: Negative for color change, pallor, rash and wound. Neurological: Negative for dizziness and light-headedness. Psychiatric/Behavioral: Negative for agitation and hallucinations. Physical Exam  
 
Visit Vitals /72 (BP 1 Location: Right arm, BP Patient Position: At rest;Supine) Pulse 96 Temp (!) 101.2 °F (38.4 °C) Resp 17 SpO2 96% Physical Exam  
Constitutional: He is oriented to person, place, and time. He appears well-developed and well-nourished. No distress. HENT:  
Head: Normocephalic and atraumatic. Right Ear: External ear normal.  
Left Ear: External ear normal.  
Nose: Rhinorrhea present. Mouth/Throat: Oropharynx is clear and moist. No oropharyngeal exudate. Eyes: Conjunctivae and EOM are normal. Pupils are equal, round, and reactive to light. Right eye exhibits no discharge. Left eye exhibits no discharge. Neck: Normal range of motion. Neck supple. No JVD present. No tracheal deviation present. No thyromegaly present. No tenderness over neck, active ROM without pain. No meningeal signs Cardiovascular: Normal rate, regular rhythm, normal heart sounds and intact distal pulses. Pulmonary/Chest: Effort normal and breath sounds normal. No stridor. No respiratory distress. He has no wheezes. He has no rales. He exhibits no tenderness. Abdominal: Soft. Bowel sounds are normal. He exhibits no distension and no mass. There is no tenderness. There is no rebound and no guarding. Musculoskeletal: Normal range of motion. He exhibits no edema, tenderness or deformity. Lymphadenopathy:  
  He has no cervical adenopathy. Neurological: He is alert and oriented to person, place, and time. He has normal reflexes. He displays normal reflexes. No cranial nerve deficit. He exhibits normal muscle tone. Coordination normal.  
Skin: Skin is warm and dry. No rash noted. He is not diaphoretic. No erythema. No pallor. Psychiatric: He has a normal mood and affect. His behavior is normal. Judgment and thought content normal.  
Nursing note and vitals reviewed. Diagnostic Study Results Labs - No results found for this or any previous visit (from the past 12 hour(s)). Radiologic Studies - No orders to display Medical Decision Making I am the first provider for this patient. I reviewed the vital signs, available nursing notes, past medical history, past surgical history, family history and social history. Vital Signs-Reviewed the patient's vital signs. Pulse Oximetry Analysis -  100 on room air (Interpretation) Records Reviewed: Nursing Notes, Old Medical Records, Previous Radiology Studies and Previous Laboratory Studies (Time of Review: 4:00 AM) ED Course: Progress Notes, Reevaluation, and Consults: 
4:57 AM Pt pacing in the room, asking if he can be discharged Provider Notes (Medical Decision Making): 
Pt had no evidence of ottits media on exam, no evidence of PTA or pharyngitis. Pt had no evidence of sinus infection. Pt had no neck stiffness, focal neurological deficient, pain with EOM movement, mentation well, and no chronic medical hx so unlikely to have meningitis at this time. Pt had no cough/fine 02 sats with no hx of COPD so unlikely to have pneumonia at this time. Pt most likely has a viral URI, probable flu, and was given symptomatic treatment, and strict return precautions. Probable influenza, ourside window for Tamiflu. No evidence for bacterial infection. Patient offered Ibuprofen--will take it at home. Wants to go home. Pt feels safe for discharge at this time with his plans, was given strict return precautions for follow up. He course of care, and dx was explained, as well as any findings on imaging were discussed with the patient. Pt agrees to treatment and follow up as recommended. Diagnosis Clinical Impression: 1. Viral upper respiratory tract infection Disposition: discharge home Follow-up Information Follow up With Specialties Details Why Contact Info Veronica Espitia, DO Family Practice In 2 days If symptoms worsen [address] [facility], [place] [address] 
[phone] Medication List  
  
START taking these medications   
ibuprofen 600 mg tablet Commonly known as:  MOTRIN Take 1 Tab by mouth every six (6) hours as needed for Pain. CHANGE how you take these medications * acetaminophen 325 mg tablet Commonly known as:  TYLENOL Take 2 Tabs by mouth every four (4) hours as needed for Pain. What changed:  Another medication with the same name was added. Make sure you understand how and when to take each. * acetaminophen 325 mg tablet Commonly known as:  TYLENOL Take 3 Tabs by mouth every six (6) hours as needed for Pain. What changed: You were already taking a medication with the same name, and this prescription was added. Make sure you understand how and when to take each. * This list has 2 medication(s) that are the same as other medications prescribed for you. Read the directions carefully, and ask your doctor or other care provider to review them with you. CONTINUE taking these medications   
diclofenac EC 75 mg EC tablet Commonly known as:  VOLTAREN Take 1 Tab by mouth two (2) times daily (with meals). gabapentin 300 mg capsule Commonly known as:  NEURONTIN Take 1 in the evening after dinner for 1 week then increase to 2 
  
metaxalone 800 mg tablet Commonly known as:  SKELAXIN 
1 po bid -tid as needed for spasm Where to Get Your Medications Information about where to get these medications is not yet available Ask your nurse or doctor about these medications · acetaminophen 325 mg tablet · ibuprofen 600 mg tablet 
  
 
_______________________________ As a resident physician all patient care information is shared with my attending physician. All History, Physical Exam, Laboratory and Imaging results, Medical Decision Making, and Disposition is discussed and approved by my attending. Inocencia Felder, DO PGY-2 Emergency Medicine Resident 
 
 
_______________________________

## 2019-01-19 NOTE — DISCHARGE INSTRUCTIONS
Patient Education        Saline Nasal Washes for Children: Care Instructions  Your Care Instructions  Your doctor may suggest that you use salt water (saline) to wash mucus from your child's nose and sinuses. This simple remedy can help relieve symptoms of allergies, sinusitis, and colds. Most children notice a little burning sensation in the nose the first few times the solution is used, but this usually gets better in a few days. Follow-up care is a key part of your child's treatment and safety. Be sure to make and go to all appointments, and call your doctor if your child is having problems. It's also a good idea to know your child's test results and keep a list of the medicines your child takes. How can you care for your child at home? · You can buy premixed saline solution in a squeeze bottle at a drugstore. Read and follow the instructions on the label. · You can make your own saline solution at home by adding 1 teaspoon of salt and 1 teaspoon of baking soda to 2 cups of distilled water. · If you use a homemade solution, pour a small amount into a clean bowl. Using a rubber bulb syringe, squeeze the syringe and place the tip in the salt water. Draw a small amount into the syringe by relaxing your hand. · Have your child sit down with his or her head tilted slightly back. Do not have your child lie down. Put the tip of the bulb syringe or squeeze bottle a little way into one of your child's nostrils. Gently drip or squirt a few drops into the nostril. Repeat with the other nostril. Some sneezing and gagging are normal at first.  · Have your child blow his or her nose. If your child is too young to blow, gently suction the nostrils with the bulb syringe. · Wipe the syringe or bottle tip clean after each use. · Repeat this 2 or 3 times a day. · Use nasal washes gently in children who have frequent nosebleeds. When should you call for help?   Watch closely for changes in your child's health, and be sure to contact your doctor if your child has any problems. Where can you learn more? Go to http://dorina-lakia.info/. Enter K973 in the search box to learn more about \"Saline Nasal Washes for Children: Care Instructions. \"  Current as of: March 27, 2018  Content Version: 11.9  © 4454-9593 [employer]. Care instructions adapted under license by [employer] (which disclaims liability or warranty for this information). If you have questions about a medical condition or this instruction, always ask your healthcare professional. [place] [address] any warranty or liability for your use of this information. Patient Education        Upper Respiratory Infection (Cold): Care Instructions  Your Care Instructions    An upper respiratory infection, or URI, is an infection of the nose, sinuses, or throat. URIs are spread by coughs, sneezes, and direct contact. The common cold is the most frequent kind of URI. The flu and sinus infections are other kinds of URIs. Almost all URIs are caused by viruses. Antibiotics won't cure them. But you can treat most infections with home care. This may include drinking lots of fluids and taking over-the-counter pain medicine. You will probably feel better in 4 to 10 days. The doctor has checked you carefully, but problems can develop later. If you notice any problems or new symptoms, get medical treatment right away. Follow-up care is a key part of your treatment and safety. Be sure to make and go to all appointments, and call your doctor if you are having problems. It's also a good idea to know your test results and keep a list of the medicines you take. How can you care for yourself at home? · To prevent dehydration, drink plenty of fluids, enough so that your urine is light yellow or clear like water. Choose water and other caffeine-free clear liquids until you feel better.  If you have kidney, heart, or liver disease and have to limit fluids, talk with your doctor before you increase the amount of fluids you drink. · Take an over-the-counter pain medicine, such as acetaminophen (Tylenol), ibuprofen (Advil, Motrin), or naproxen (Aleve). Read and follow all instructions on the label. · Before you use cough and cold medicines, check the label. These medicines may not be safe for young children or for people with certain health problems. · Be careful when taking over-the-counter cold or flu medicines and Tylenol at the same time. Many of these medicines have acetaminophen, which is Tylenol. Read the labels to make sure that you are not taking more than the recommended dose. Too much acetaminophen (Tylenol) can be harmful. · Get plenty of rest.  · Do not smoke or allow others to smoke around you. If you need help quitting, talk to your doctor about stop-smoking programs and medicines. These can increase your chances of quitting for good. When should you call for help? Call 911 anytime you think you may need emergency care. For example, call if:    · You have severe trouble breathing.    Call your doctor now or seek immediate medical care if:    · You seem to be getting much sicker.     · You have new or worse trouble breathing.     · You have a new or higher fever.     · You have a new rash.    Watch closely for changes in your health, and be sure to contact your doctor if:    · You have a new symptom, such as a sore throat, an earache, or sinus pain.     · You cough more deeply or more often, especially if you notice more mucus or a change in the color of your mucus.     · You do not get better as expected. Where can you learn more? Go to http://dorina-lakia.info/. Enter Z241 in the search box to learn more about \"Upper Respiratory Infection (Cold): Care Instructions. \"  Current as of: September 5, 2018  Content Version: 11.9  © 4529-0594 [employer].  Care instructions adapted under license by [employer] (which disclaims liability or warranty for this information). If you have questions about a medical condition or this instruction, always ask your healthcare professional. [address] any warranty or liability for your use of this information.

## 2019-01-23 ENCOUNTER — OFFICE VISIT (OUTPATIENT)
Dept: ORTHOPEDIC SURGERY | Age: 24
End: 2019-01-23

## 2019-01-23 VITALS
RESPIRATION RATE: 17 BRPM | OXYGEN SATURATION: 99 % | WEIGHT: 218 LBS | HEART RATE: 83 BPM | HEIGHT: 70 IN | TEMPERATURE: 98.2 F | DIASTOLIC BLOOD PRESSURE: 79 MMHG | SYSTOLIC BLOOD PRESSURE: 141 MMHG | BODY MASS INDEX: 31.21 KG/M2

## 2019-01-23 DIAGNOSIS — M62.838 MUSCLE SPASM: ICD-10-CM

## 2019-01-23 DIAGNOSIS — S29.019D STRAIN OF THORACIC REGION, SUBSEQUENT ENCOUNTER: ICD-10-CM

## 2019-01-23 DIAGNOSIS — M79.2 NEURITIS: ICD-10-CM

## 2019-01-23 DIAGNOSIS — S39.012D STRAIN OF LUMBAR PARASPINOUS MUSCLE, SUBSEQUENT ENCOUNTER: ICD-10-CM

## 2019-01-23 DIAGNOSIS — S16.1XXD STRAIN OF NECK MUSCLE, SUBSEQUENT ENCOUNTER: Primary | ICD-10-CM

## 2019-01-23 DIAGNOSIS — M51.34 DDD (DEGENERATIVE DISC DISEASE), THORACIC: ICD-10-CM

## 2019-01-23 RX ORDER — GABAPENTIN 300 MG/1
CAPSULE ORAL
Qty: 60 CAP | Refills: 1 | Status: SHIPPED | OUTPATIENT
Start: 2019-01-23 | End: 2019-04-01

## 2019-01-23 RX ORDER — METAXALONE 800 MG/1
TABLET ORAL
Qty: 60 TAB | Refills: 1 | Status: SHIPPED | OUTPATIENT
Start: 2019-01-23 | End: 2019-04-01

## 2019-01-23 RX ORDER — DICLOFENAC SODIUM 75 MG/1
75 TABLET, DELAYED RELEASE ORAL 2 TIMES DAILY WITH MEALS
Qty: 60 TAB | Refills: 1 | Status: SHIPPED | OUTPATIENT
Start: 2019-01-23 | End: 2019-04-01 | Stop reason: SDUPTHER
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
## 2019-01-23 NOTE — PATIENT INSTRUCTIONS
Low Back Arthritis: Exercises Your Care Instructions Here are some examples of typical rehabilitation exercises for your condition. Start each exercise slowly. Ease off the exercise if you start to have pain. Your doctor or physical therapist will tell you when you can start these exercises and which ones will work best for you. When you are not being active, find a comfortable position for rest. Some people are comfortable on the floor or a medium-firm bed with a small pillow under their head and another under their knees. Some people prefer to lie on their side with a pillow between their knees. Don't stay in one position for too long. Take short walks (10 to 20 minutes) every 2 to 3 hours. Avoid slopes, hills, and stairs until you feel better. Walk only distances you can manage without pain, especially leg pain. How to do the exercises Pelvic tilt 1. Lie on your back with your knees bent. 2. \"Brace\" your stomachtighten your muscles by pulling in and imagining your belly button moving toward your spine. 3. Press your lower back into the floor. You should feel your hips and pelvis rock back. 4. Hold for 6 seconds while breathing smoothly. 5. Relax and allow your pelvis and hips to rock forward. 6. Repeat 8 to 12 times. Back stretches 1. Get down on your hands and knees on the floor. 2. Relax your head and allow it to droop. Round your back up toward the ceiling until you feel a nice stretch in your upper, middle, and lower back. Hold this stretch for as long as it feels comfortable, or about 15 to 30 seconds. 3. Return to the starting position with a flat back while you are on your hands and knees. 4. Let your back sway by pressing your stomach toward the floor. Lift your buttocks toward the ceiling. 5. Hold this position for 15 to 30 seconds. 6. Repeat 2 to 4 times. Follow-up care is a key part of your treatment and safety.  Be sure to make and go to all appointments, and call your doctor if you are having problems. It's also a good idea to know your test results and keep a list of the medicines you take. Where can you learn more? Go to http://dorina-lakia.info/. Enter H123 in the search box to learn more about \"Low Back Arthritis: Exercises. \" Current as of: September 20, 2018 Content Version: 11.9 © 1873-8733 [employer]. Care instructions adapted under license by [employer] (which disclaims liability or warranty for this information). If you have questions about a medical condition or this instruction, always ask your healthcare professional. [address] any warranty or liability for your use of this information.

## 2019-01-25 ENCOUNTER — HOSPITAL ENCOUNTER (OUTPATIENT)
Dept: PHYSICAL THERAPY | Age: 24
Discharge: HOME | End: 2019-01-25
Payer: OTHER GOVERNMENT

## 2019-01-25 PROCEDURE — 97161 PT EVAL LOW COMPLEX 20 MIN: CPT

## 2019-01-25 PROCEDURE — 97110 THERAPEUTIC EXERCISES: CPT
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
## 2019-01-25 NOTE — PROGRESS NOTES
PT DAILY TREATMENT NOTE 12 Patient Name: Randy Rojas Date:2019 : 1995 [x]  Patient  Verified Payor: JERARDO / Plan: Luis Antonio Bran / Product Type: Merry Leak / In time:9:45am  Out time:10:28am 
Total Treatment Time (min): 43 Visit #: 1 of 8 Treatment Area: Strain of muscle, fascia and tendon at neck level, subsequent encounter [S16. 1XXD] Strain of muscle, fascia and tendon of lower back, subsequent encounter [S39.012D] Strain of muscle and tendon of unspecified wall of thorax, subsequent encounter [S29.019D] Other intervertebral disc degeneration, thoracic region [M51.34] Other muscle spasm [M62.838] Neuralgia and neuritis, unspecified [M79.2] SUBJECTIVE Pain Level (0-10 scale): 6/10 Any medication changes, allergies to medications, adverse drug reactions, diagnosis change, or new procedure performed?: [x] No    [] Yes (see summary sheet for update) Subjective functional status/changes:   [] No changes reported OBJECTIVE 35 min [x]Eval                  []Re-Eval  
 
 
8 min Therapeutic Exercise:  [] See flow sheet : reviewed current HEP Rationale: increase ROM and increase strength to improve the patients ability to perform ADLs and functional mobility tasks with improved ease and decreased pain. With 
 [] TE 
 [] TA 
 [] neuro 
 [] other: Patient Education: [x] Review HEP [] Progressed/Changed HEP based on:  
[] positioning   [] body mechanics   [] transfers   [] heat/ice application   
[] other:   
 
Other Objective/Functional Measures: Did not issue pictorial HEP today's visit, as pt has HEP from prior PT from . Pt's HEP includes neural glides for B UEs, supine trunk rotation, supine pelvic tilts, prone on elbows. Pt was able to demonstrate these to therapist.  Therapist advised pt to continue with his current HEP. Pain Level (0-10 scale) post treatment: 6/10 ASSESSMENT/Changes in Function: Patient will continue to benefit from skilled PT services to modify and progress therapeutic interventions, address functional mobility deficits, address ROM deficits, address strength deficits, analyze and address soft tissue restrictions, analyze and cue movement patterns, analyze and modify body mechanics/ergonomics and assess and modify postural abnormalities to attain remaining goals. []  See Plan of Care 
[]  See progress note/recertification 
[]  See Discharge Summary Progress towards goals / Updated goals: 
Per POC PLAN 
[]  Upgrade activities as tolerated     [x]  Continue plan of care 
[]  Update interventions per flow sheet      
[]  Discharge due to:_ 
[]  Other:_   
 
Michelle Molina, PT 1/25/2019  11:34 AM 
 
Future Appointments Date Time Provider Alfredo Roman 1/30/2019  2:15 PM Lenin Lucia, PT [facility]  
2/1/2019  2:15 PM Lenin Lucia, PT [facility]  
2/5/2019  3:00 PM Kevin Brizuela [facility]  
2/8/2019  8:30 AM Kevin Brizuela [facility]  
2/11/2019  3:00 PM Randi Cook PT [facility]  
2/15/2019  9:45 AM Lenin Lucia PT [facility]  
2/18/2019  2:15 PM Lenin Lucia PT [facility]  
3/4/2019  9:50 AM Ariane Cross  [address]

## 2019-02-01 ENCOUNTER — HOSPITAL ENCOUNTER (OUTPATIENT)
Dept: PHYSICAL THERAPY | Age: 24
Discharge: HOME | End: 2019-02-01
Payer: OTHER GOVERNMENT

## 2019-02-01 PROCEDURE — 97113 AQUATIC THERAPY/EXERCISES: CPT

## 2019-02-01 NOTE — PROGRESS NOTES
PT DAILY TREATMENT NOTE  Patient Name: Afia Jean Date:2019 : 1995 [x]  Patient  Verified Payor: JERARDO / Plan: [place] [address] / Product Type: Jina Rivet / In time:2:35pm  Out time:3:05pm 
Total Treatment Time (min): 30 Visit #: 2 of 8 Treatment Area: Low back pain [M54.5] Cervicalgia [M54.2] SUBJECTIVE Pain Level (0-10 scale): -7/10 Any medication changes, allergies to medications, adverse drug reactions, diagnosis change, or new procedure performed?: [x] No    [] Yes (see summary sheet for update) Subjective functional status/changes:   [] No changes reported Pt states that he missed his last therapy appt due to driving to [facility] [address] to  his son. He states \"I feel about the same today\". (the car drive did not aggravate his back pain). He states, \"I try to do that cat/camel stretch every morning before I get out of bed\". OBJECTIVE 30 min Therapeutic Exercise:  [x] See flow sheet : Aquatic therapy Rationale: increase ROM and increase strength to improve the patients ability to perform ADLs and functional mobility tasks with improved ease and decreased pain. With 
 [] TE 
 [] TA 
 [] neuro 
 [] other: Patient Education: [x] Review HEP [] Progressed/Changed HEP based on:  
[] positioning   [] body mechanics   [] transfers   [] heat/ice application   
[] other:   
 
Other Objective/Functional Measures: Pt arrived 20 minutes late for appt. Initiated aquatic exercises (first f/u visit). Pain Level (0-10 scale) post treatment: -7/10 ASSESSMENT/Changes in Function: Pt had good tolerance of aquatic exercises with no increase in pain reported. Limited exercises performed due to time constraints/pt arriving late for appt.    
 
Patient will continue to benefit from skilled PT services to modify and progress therapeutic interventions, address functional mobility deficits, address ROM deficits, address strength deficits, analyze and cue movement patterns, analyze and modify body mechanics/ergonomics and assess and modify postural abnormalities to attain remaining goals. []  See Plan of Care 
[]  See progress note/recertification 
[]  See Discharge Summary Progress towards goals / Updated goals: 
Short Term Goals: To be accomplished in 1 weeks: 1. Pt will be compliant with HEP to supplement aquatic therapy and maximize therapeutic benefit. Pt reports not performing his HEP consistently over past week as he was traveling to [facility]. (2/1/19). Long Term Goals: To be accomplished in 4-6 weeks: 1. Pt will have improved FOTO score of 50 or greater to indicate improved functional ability. 2. Pt will have reports of average pain level <3/10 on the PAS to improve functional activity ease and tolerance. 3. Pt will demonstrate trunk flexion and extension to WNL void of pain to improve tolerance of ADLs. 4. Pt will be independent with aquatic exercise program to transition to community based pool, to continue on his own for self management.  
  
 
 
 
PLAN [x]  Upgrade activities as tolerated     [x]  Continue plan of care 
[]  Update interventions per flow sheet      
[]  Discharge due to:_ 
[]  Other:_   
 
Sharyle Mood, PT 2/1/2019  2:41 PM 
 
Future Appointments Date Time Provider Alfredo Roman 2/5/2019  3:00 PM Chirag Puente [facility]  
2/8/2019  8:30 AM Chirag Puente [facility]  
2/11/2019  3:00 PM Carol Garland PT [facility]  
2/15/2019  9:45 AM Viral Qiu PT [facility]  
2/18/2019  2:15 PM Viral Qiu PT [facility]  
3/4/2019  9:50 AM Chaz Lentz  [address]

## 2019-02-05 ENCOUNTER — HOSPITAL ENCOUNTER (OUTPATIENT)
Dept: PHYSICAL THERAPY | Age: 24
Discharge: HOME | End: 2019-02-05
Payer: OTHER GOVERNMENT

## 2019-02-05 PROCEDURE — 97113 AQUATIC THERAPY/EXERCISES: CPT

## 2019-02-05 NOTE — PROGRESS NOTES
PT DAILY TREATMENT NOTE  Patient Name: Eleazar Chaves Date:2019 : 1995 [x]  Patient  Verified Payor: JERARDO / Plan: Dakota Prather 74 / Product Type: Abdiaziz Stammer / In time:2:58pm  Out time:3:27pm 
Total Treatment Time (min): 29 Visit #: 3 of 8 Treatment Area: Low back pain [M54.5] Cervicalgia [M54.2] SUBJECTIVE Pain Level (0-10 scale): 6-7 Any medication changes, allergies to medications, adverse drug reactions, diagnosis change, or new procedure performed?: [x] No    [] Yes (see summary sheet for update) Subjective functional status/changes:   [] No changes reported \"It's about the same as last time. \" OBJECTIVE Halle Peaks 29 min Therapeutic Exercise:  [x] See flow sheet : Aquatics Rationale: increase ROM and increase strength to improve the patients ability to improve ease of daily activity. With 
 [] TE 
 [] TA 
 [] neuro 
 [] other: Patient Education: [x] Review HEP [] Progressed/Changed HEP based on:  
[] positioning   [] body mechanics   [] transfers   [] heat/ice application   
[] other:   
 
Other Objective/Functional Measures: kyphotic posture, limited mid T/S extension mobility Pain Level (0-10 scale) post treatment: 6-7 ASSESSMENT/Changes in Function: Pt reports no change in symptoms during treatment despite no apparent impairments with performance of interventions. He does appear limited 2/2 kyphotic posture and limited T/s extension mobility, but otherwise no limitations noted. Patient will continue to benefit from skilled PT services to modify and progress therapeutic interventions, address functional mobility deficits, address ROM deficits, analyze and cue movement patterns and instruct in home and community integration to attain remaining goals. []  See Plan of Care 
[]  See progress note/recertification 
[]  See Discharge Summary Progress towards goals / Updated goals: 
Short Term Goals: To be accomplished in 1 weeks:              1. Pt will be compliant with HEP to supplement aquatic therapy and maximize therapeutic benefit. Pt reports not performing his HEP consistently over past week as he was traveling to [facility]. (2/1/19). Long Term Goals: To be accomplished in 4-6 weeks: 
             1. Pt will have improved FOTO score of 50 or greater to indicate improved functional ability.              2. Pt will have reports of average pain level <3/10 on the PAS to improve functional activity ease and tolerance. 
             3. Pt will demonstrate trunk flexion and extension to WNL void of pain to improve tolerance of ADLs.              4. Pt will be independent with aquatic exercise program to transition to community based pool, to continue on his own for self management. PLAN [x]  Upgrade activities as tolerated     [x]  Continue plan of care 
[]  Update interventions per flow sheet      
[]  Discharge due to:_ 
[]  Other:_ Faizan Peaks 2/5/2019  3:03 PM 
 
Future Appointments Date Time Provider Alfredo Roman 2/8/2019  8:30 [facility]  
2/11/2019  3:00 PM [facility]  
2/15/2019  9:45 AM Viktor Roque, PT [facility]  
2/18/2019  2:15 PM Viktor Roque, PT [facility]  
3/4/2019  9:50 AM Adilene Richardson MD [facility]

## 2019-02-08 ENCOUNTER — APPOINTMENT (OUTPATIENT)
Dept: PHYSICAL THERAPY | Age: 24
End: 2019-02-08
Payer: OTHER GOVERNMENT

## 2019-02-11 ENCOUNTER — HOSPITAL ENCOUNTER (OUTPATIENT)
Dept: PHYSICAL THERAPY | Age: 24
Discharge: HOME | End: 2019-02-11
Payer: OTHER GOVERNMENT

## 2019-02-11 PROCEDURE — 97113 AQUATIC THERAPY/EXERCISES: CPT

## 2019-02-11 NOTE — PROGRESS NOTES
PT DAILY TREATMENT NOTE  Patient Name: Kaykay Ly Date:2019 : 1995 [x]  Patient  Verified Payor:  / Plan: Mercer  / Product Type: Jamesen Boeck / In time: 3:00  Out time: 3:39 Total Treatment Time (min): 39 Visit #: 4 of 8 Treatment Area: Low back pain [M54.5] Cervicalgia [M54.2] SUBJECTIVE Pain Level (0-10 scale): 8-9 Any medication changes, allergies to medications, adverse drug reactions, diagnosis change, or new procedure performed?: [x] No    [] Yes (see summary sheet for update) Subjective functional status/changes:   [] No changes reported Reports having more pain today and states he thinks it is from the weather. OBJECTIVE 39 min Therapeutic Exercise:  [x] See flow sheet : Aquatics Rationale: increase ROM and increase strength to improve the patients ability to improve ease of daily activity. With 
 [] TE 
 [] TA 
 [] neuro 
 [] other: Patient Education: [x] Review HEP [] Progressed/Changed HEP based on:  
[] positioning   [] body mechanics   [] transfers   [] heat/ice application   
[] other:   
 
Other Objective/Functional Measures: Performed TM walk-jog. Pain Level (0-10 scale) post treatment: 8-9 ASSESSMENT/Changes in Function: Reported no change in pain post session. Reports having increases strain on the low back with hip EXT during hipx3 exercise and educated pt to avoid excessive lumbar/thoracic EXT with this exercise to improve glute activiation. Pt continues to report elevated pain levels pre and post session but able to complete exercises in flow sheet. Continue POC as tolerated.   
  
Patient will continue to benefit from skilled PT services to modify and progress therapeutic interventions, address functional mobility deficits, address ROM deficits, address strength deficits, analyze and address soft tissue restrictions, analyze and cue movement patterns, analyze and modify body mechanics/ergonomics, assess and modify postural abnormalities and instruct in home and community integration to attain remaining goals. []  See Plan of Care 
[]  See progress note/recertification 
[]  See Discharge Summary Progress towards goals / Updated goals: 
Short Term Goals: To be accomplished in 1 weeks: 
             1. Pt will be compliant with HEP to supplement aquatic therapy and maximize therapeutic benefit. Pt reports not performing his HEP consistently over past week as he was traveling to [facility]. (2/1/19). Long Term Goals: To be accomplished in 4-6 weeks: 
             1. Pt will have improved FOTO score of 50 or greater to indicate improved functional ability.              2. Pt will have reports of average pain level <3/10 on the PAS to improve functional activity ease and tolerance. 
             3. Pt will demonstrate trunk flexion and extension to WNL void of pain to improve tolerance of ADLs.              4. Pt will be independent with aquatic exercise program to transition to community based pool, to continue on his own for self management. PLAN [x]  Upgrade activities as tolerated     [x]  Continue plan of care [x]  Update interventions per flow sheet      
[]  Discharge due to:_ 
[]  Other:_ Karma Bennett, PT 2/11/2019  3:08 PM 
 
Future Appointments Date Time Provider Alfredo Roman 2/11/2019  3:00 PM Carol Garland, PT [facility]  
2/15/2019  9:45 AM [facility]  
2/18/2019  2:15 PM [facility]  
3/4/2019  9:50 AM Chaz Lentz  [address]

## 2019-02-19 NOTE — PROGRESS NOTES
In [address]  [address], [address] 
[phone] fax Physical Therapy Discharge Summary Patient name: Lydia Pierce Start of Care: 2019 Referral source: Tomeka German MD : 1995 Medical Diagnosis: Strain of muscle, fascia and tendon at neck level, subsequent encounter [S16. 1XXD] Strain of muscle, fascia and tendon of lower back, subsequent encounter [S39.012D] Strain of muscle and tendon of unspecified wall of thorax, subsequent encounter [S29.019D] Other intervertebral disc degeneration, thoracic region [M51.34] Other muscle spasm [M62.838] Neuralgia and neuritis, unspecified [M79.2] Payor: qianchengwuyou / Plan: Dakota Prather 74 / Product Type: Giancarlo Grider /  Onset Date:18 Treatment Diagnosis: back pain Prior Hospitalization: see medical history Provider#: [id number] Medications: Verified on Patient summary List  
 Comorbidities: Hx of left LCL/ACL surgical repair  Prior Level of Function: (I) with all ADLs and functional mobility without pain, weight lifting/exercising regularly. Visits from Start of Care: 4    Missed Visits: 4 Reporting Period : 19 to 19 Summary of Care: Pt was seen for PT initial evaluation and 3 follow up visits of aquatic therapy. Pt has missed 4 visits and several attempts have been made to contact pt (including leaving message with his emergency contact), but pt has failed to contact this office regarding his therapy appointments. Progress towards goals:   
Short Term Goals:  
             1.  Pt will be compliant with HEP to supplement aquatic therapy and maximize therapeutic benefit. Pt reports not performing his HEP consistently over past week as he was traveling to [facility]. (19).  Unable to reassess, as pt did not return to PT.    
Long Term Goals:  
              1. Pt will have improved FOTO score of 50 or greater to indicate improved functional ability. Unable to reassess, as pt did not return to PT. 
             2. Pt will have reports of average pain level <3/10 on the PAS to improve functional activity ease and tolerance. Unable to reassess, as pt did not return to PT. 
             3. Pt will demonstrate trunk flexion and extension to WNL void of pain to improve tolerance of ADLs. Unable to reassess, as pt did not return to PT. 
             4. Pt will be independent with aquatic exercise program to transition to community based pool, to continue on his own for self management. Unable to reassess, as pt did not return to PT. ASSESSMENT/RECOMMENDATIONS: Unable to further assess progress towards goals at this time due to non-compliance/lack of attendance. DC at this time with no further instructions to the patient. Thank you for this referral. 
 
[x]Discontinue therapy: []Patient has reached or is progressing toward set goals [x]Patient is non-compliant or has abdicated 
    []Due to lack of appreciable progress towards set goals Leena Carranza, PT 2/19/2019 12:00 PM

## 2019-04-01 ENCOUNTER — OFFICE VISIT (OUTPATIENT)
Dept: ORTHOPEDIC SURGERY | Age: 24
End: 2019-04-01

## 2019-04-01 VITALS
HEIGHT: 70 IN | BODY MASS INDEX: 32.38 KG/M2 | WEIGHT: 226.2 LBS | RESPIRATION RATE: 16 BRPM | DIASTOLIC BLOOD PRESSURE: 80 MMHG | OXYGEN SATURATION: 99 % | HEART RATE: 72 BPM | SYSTOLIC BLOOD PRESSURE: 148 MMHG | TEMPERATURE: 98 F

## 2019-04-01 DIAGNOSIS — S39.012D STRAIN OF LUMBAR PARASPINOUS MUSCLE, SUBSEQUENT ENCOUNTER: ICD-10-CM

## 2019-04-01 DIAGNOSIS — S29.019D STRAIN OF THORACIC REGION, SUBSEQUENT ENCOUNTER: ICD-10-CM

## 2019-04-01 DIAGNOSIS — M79.2 NEURITIS: ICD-10-CM

## 2019-04-01 DIAGNOSIS — S16.1XXD STRAIN OF NECK MUSCLE, SUBSEQUENT ENCOUNTER: Primary | ICD-10-CM

## 2019-04-01 DIAGNOSIS — M51.34 DDD (DEGENERATIVE DISC DISEASE), THORACIC: ICD-10-CM
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
## 2019-04-01 DIAGNOSIS — M62.838 MUSCLE SPASM: ICD-10-CM

## 2019-04-01 RX ORDER — GABAPENTIN 300 MG/1
CAPSULE ORAL
Qty: 120 CAP | Refills: 2 | Status: SHIPPED | OUTPATIENT
Start: 2019-04-01

## 2019-04-01 RX ORDER — METHOCARBAMOL 750 MG/1
750 TABLET, FILM COATED ORAL 3 TIMES DAILY
Qty: 60 TAB | Refills: 1 | Status: SHIPPED | OUTPATIENT
Start: 2019-04-01

## 2019-04-01 RX ORDER — DICLOFENAC SODIUM 75 MG/1
75 TABLET, DELAYED RELEASE ORAL 2 TIMES DAILY WITH MEALS
Qty: 60 TAB | Refills: 1 | Status: SHIPPED | OUTPATIENT
Start: 2019-04-01

## 2019-04-01 RX ORDER — CYCLOBENZAPRINE HCL 5 MG
TABLET ORAL
COMMUNITY

## 2019-04-01 NOTE — LETTER
4/3/19 Patient: Kyara Love YOB: 1995 Date of Visit: 4/1/2019 Yamilet Jordan DO 
[address] [facility] [address] VIA Facsimile: [phone] Dear Yamilet Jordan DO, Thank you for referring Mr. Jeovanny Garcia to [address] [facility] for evaluation. My notes for this consultation are attached. If you have questions, please do not hesitate to call me. I look forward to following your patient along with you. Sincerely, Karl Redmond MD

## 2019-04-01 NOTE — PATIENT INSTRUCTIONS
Low Back Arthritis: Exercises Your Care Instructions Here are some examples of typical rehabilitation exercises for your condition. Start each exercise slowly. Ease off the exercise if you start to have pain. Your doctor or physical therapist will tell you when you can start these exercises and which ones will work best for you. When you are not being active, find a comfortable position for rest. Some people are comfortable on the floor or a medium-firm bed with a small pillow under their head and another under their knees. Some people prefer to lie on their side with a pillow between their knees. Don't stay in one position for too long. Take short walks (10 to 20 minutes) every 2 to 3 hours. Avoid slopes, hills, and stairs until you feel better. Walk only distances you can manage without pain, especially leg pain. How to do the exercises Pelvic tilt 1. Lie on your back with your knees bent. 2. \"Brace\" your stomachtighten your muscles by pulling in and imagining your belly button moving toward your spine. 3. Press your lower back into the floor. You should feel your hips and pelvis rock back. 4. Hold for 6 seconds while breathing smoothly. 5. Relax and allow your pelvis and hips to rock forward. 6. Repeat 8 to 12 times. Back stretches 1. Get down on your hands and knees on the floor. 2. Relax your head and allow it to droop. Round your back up toward the ceiling until you feel a nice stretch in your upper, middle, and lower back. Hold this stretch for as long as it feels comfortable, or about 15 to 30 seconds. 3. Return to the starting position with a flat back while you are on your hands and knees. 4. Let your back sway by pressing your stomach toward the floor. Lift your buttocks toward the ceiling. 5. Hold this position for 15 to 30 seconds. 6. Repeat 2 to 4 times. Follow-up care is a key part of your treatment and safety.  Be sure to make and go to all appointments, and call your doctor if you are having problems. It's also a good idea to know your test results and keep a list of the medicines you take. Where can you learn more? Go to http://dorina-lakia.info/. Enter M355 in the search box to learn more about \"Low Back Arthritis: Exercises. \" Current as of: September 20, 2018 Content Version: 11.9 © 8104-0112 [employer]. Care instructions adapted under license by [employer] (which disclaims liability or warranty for this information). If you have questions about a medical condition or this instruction, always ask your healthcare professional. [address] any warranty or liability for your use of this information.
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
## 2019-04-03 ENCOUNTER — HOSPITAL ENCOUNTER (OUTPATIENT)
Dept: MRI IMAGING | Age: 24
Discharge: HOME | End: 2019-04-03
Attending: FAMILY MEDICINE
Payer: OTHER GOVERNMENT

## 2019-04-03 DIAGNOSIS — M54.50 LOWER BACK PAIN: ICD-10-CM

## 2019-04-03 PROCEDURE — 72148 MRI LUMBAR SPINE W/O DYE: CPT

## 2019-04-16 ENCOUNTER — APPOINTMENT (OUTPATIENT)
Dept: PHYSICAL THERAPY | Age: 24
End: 2019-04-16
Payer: OTHER GOVERNMENT

## 2019-04-24 ENCOUNTER — HOSPITAL ENCOUNTER (OUTPATIENT)
Dept: PHYSICAL THERAPY | Age: 24
Discharge: HOME | End: 2019-04-24
Payer: OTHER GOVERNMENT

## 2019-04-24 PROCEDURE — 97110 THERAPEUTIC EXERCISES: CPT

## 2019-04-24 PROCEDURE — 97162 PT EVAL MOD COMPLEX 30 MIN: CPT
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
## 2019-05-06 ENCOUNTER — APPOINTMENT (OUTPATIENT)
Dept: PHYSICAL THERAPY | Age: 24
End: 2019-05-06

## 2019-05-08 ENCOUNTER — APPOINTMENT (OUTPATIENT)
Dept: PHYSICAL THERAPY | Age: 24
End: 2019-05-08

## 2019-05-08 NOTE — PROGRESS NOTES
[address] [facility] AT [address], [place], [address] [place] [address] Road  Phone: [phone]   [phone] DISCHARGE SUMMARY Patient Name: Billie Burton : 1995 Treatment/Medical Diagnosis: Upper back pain [M54.9] Lower back injury [S39.92XA] Neck pain [M54.2] Referral Source: Coby Avelar MD    
Date of Initial Visit: 2019 Attended Visits: 1 Missed Visits: 2 SUMMARY OF TREATMENT Patient issued written HEP for core and hip stretching and strengthening exercises CURRENT STATUS Patient only attended initial evaluation and then no showed his next 2 appointments. Patient contacted on 5/3/2019 to notify of discharge from therapy due to noncompliance. RECOMMENDATIONS Discontinue therapy due to lack of attendance or compliance. If you have any questions/comments please contact us directly at [phone]. Thank you for allowing us to assist in the care of your patient. Therapist Signature: Arnie Puri PT Date: 2019   Time: 5:20 PM

## 2019-05-16 ENCOUNTER — APPOINTMENT (OUTPATIENT)
Dept: PHYSICAL THERAPY | Age: 24
End: 2019-05-16

## 2019-05-23 ENCOUNTER — APPOINTMENT (OUTPATIENT)
Dept: PHYSICAL THERAPY | Age: 24
End: 2019-05-23

## 2019-05-30 ENCOUNTER — APPOINTMENT (OUTPATIENT)
Dept: PHYSICAL THERAPY | Age: 24
End: 2019-05-30

## 2023-01-31 RX ORDER — CYCLOBENZAPRINE HCL 5 MG
TABLET ORAL
COMMUNITY

## 2023-01-31 RX ORDER — METHOCARBAMOL 750 MG/1
750 TABLET, FILM COATED ORAL 3 TIMES DAILY
COMMUNITY
Start: 2019-04-01

## 2023-01-31 RX ORDER — ALBUTEROL SULFATE 90 UG/1
2 AEROSOL, METERED RESPIRATORY (INHALATION) EVERY 4 HOURS PRN
COMMUNITY
Start: 2020-01-28

## 2023-01-31 RX ORDER — DICLOFENAC SODIUM 75 MG/1
75 TABLET, DELAYED RELEASE ORAL 2 TIMES DAILY WITH MEALS
COMMUNITY
Start: 2019-04-01

## 2023-01-31 RX ORDER — GABAPENTIN 300 MG/1
CAPSULE ORAL
COMMUNITY
Start: 2019-04-01